# Patient Record
Sex: FEMALE | Race: WHITE
[De-identification: names, ages, dates, MRNs, and addresses within clinical notes are randomized per-mention and may not be internally consistent; named-entity substitution may affect disease eponyms.]

---

## 2018-03-08 ENCOUNTER — HOSPITAL ENCOUNTER (EMERGENCY)
Dept: HOSPITAL 96 - M.ERS | Age: 38
Discharge: HOME | End: 2018-03-08
Payer: COMMERCIAL

## 2018-03-08 VITALS — WEIGHT: 150 LBS | BODY MASS INDEX: 25.61 KG/M2 | HEIGHT: 64 IN

## 2018-03-08 VITALS — SYSTOLIC BLOOD PRESSURE: 144 MMHG | DIASTOLIC BLOOD PRESSURE: 87 MMHG

## 2018-03-08 DIAGNOSIS — Z88.2: ICD-10-CM

## 2018-03-08 DIAGNOSIS — F41.9: Primary | ICD-10-CM

## 2018-03-08 NOTE — EKG
[place] [address]
Phone:  [phone]                     ELECTROCARDIOGRAM REPORT      
_______________________________________________________________________________
 
Name:       EUSEBIO LEVY AMIE                Room:                      [facility]#:  [id number]      Account #:      [id number]  
Admission:  18     Attend Phys:                         
Discharge:  18     Date of Birth:  [birth year]  
         Report #: [id number]
    [id number]
_______________________________________________________________________________
THIS REPORT FOR:  //name//                      
 
                         [facility] ED
                                       
Test Date:    2018               Test Time:    05:14:29
Pat Name:     EUSEBIO LEVY            Department:   
Patient ID:   [id number]            Room:          
Gender:       F                        Technician:   TIAGO
:          1980               Requested By: Roxanna Lombardi
Order Number: [id number] MD:   Bandar Pizarro
                                 Measurements
Intervals                              Axis          
Rate:         103                      P:            32
NH:           164                      QRS:          13
QRSD:         91                       T:            29
QT:           360                                    
QTc:          471                                    
                           Interpretive Statements
Sinus tachycardia
Possible left atrial enlargement
No previous ECG available for comparison
 
Electronically Signed On 3-8-2018 15:37:55 CST by Bandar Pizarro
[email or web address]
 
 
 
 
 
 
 
 
 
 
 
 
 
 
 
 
 
 
 
  <ELECTRONICALLY SIGNED>
                                           By: Bandar Pizarro MD, [facility]   
  18     1537
D: 18 0514   _____________________________________
T: 18 05   Bandar Pizarro MD, FACC     /EPI

## 2021-09-16 ENCOUNTER — HOSPITAL ENCOUNTER (EMERGENCY)
Dept: HOSPITAL 96 - M.ERS | Age: 41
Discharge: HOME | End: 2021-09-16
Payer: COMMERCIAL

## 2021-09-16 VITALS — DIASTOLIC BLOOD PRESSURE: 93 MMHG | SYSTOLIC BLOOD PRESSURE: 153 MMHG

## 2021-09-16 VITALS — BODY MASS INDEX: 27.32 KG/M2 | WEIGHT: 160.01 LBS | HEIGHT: 64 IN

## 2021-09-16 DIAGNOSIS — F41.9: ICD-10-CM

## 2021-09-16 DIAGNOSIS — Z88.2: ICD-10-CM

## 2021-09-16 DIAGNOSIS — F41.0: Primary | ICD-10-CM
